# Patient Record
Sex: FEMALE | Race: WHITE | URBAN - METROPOLITAN AREA
[De-identification: names, ages, dates, MRNs, and addresses within clinical notes are randomized per-mention and may not be internally consistent; named-entity substitution may affect disease eponyms.]

---

## 2024-02-02 DIAGNOSIS — J45.20 MILD INTERMITTENT ASTHMA WITHOUT COMPLICATION: Primary | ICD-10-CM

## 2024-02-02 DIAGNOSIS — F41.9 ANXIETY: ICD-10-CM

## 2024-02-02 PROBLEM — J45.909 ASTHMA: Status: ACTIVE | Noted: 2024-02-02

## 2024-02-02 PROBLEM — Q97.0 TRIPLE X SYNDROME, FEMALE: Status: ACTIVE | Noted: 2018-07-30

## 2024-02-02 RX ORDER — MONTELUKAST SODIUM 10 MG/1
1 TABLET ORAL DAILY
COMMUNITY
End: 2024-02-05 | Stop reason: SDUPTHER

## 2024-02-02 RX ORDER — ALBUTEROL SULFATE 90 UG/1
1 AEROSOL, METERED RESPIRATORY (INHALATION) EVERY 4 HOURS PRN
COMMUNITY

## 2024-02-02 RX ORDER — ESCITALOPRAM OXALATE 20 MG/1
1 TABLET ORAL DAILY
COMMUNITY
End: 2024-02-05 | Stop reason: SDUPTHER

## 2024-02-02 RX ORDER — ALBUTEROL SULFATE 90 UG/1
1 AEROSOL, METERED RESPIRATORY (INHALATION) EVERY 4 HOURS PRN
COMMUNITY
End: 2024-02-05 | Stop reason: SDUPTHER

## 2024-02-05 ENCOUNTER — OFFICE VISIT (OUTPATIENT)
Age: 24
End: 2024-02-05
Payer: COMMERCIAL

## 2024-02-05 VITALS
HEIGHT: 69 IN | DIASTOLIC BLOOD PRESSURE: 70 MMHG | SYSTOLIC BLOOD PRESSURE: 98 MMHG | HEART RATE: 75 BPM | WEIGHT: 178.2 LBS | TEMPERATURE: 98 F | BODY MASS INDEX: 26.39 KG/M2 | OXYGEN SATURATION: 98 %

## 2024-02-05 DIAGNOSIS — F41.9 ANXIETY: ICD-10-CM

## 2024-02-05 DIAGNOSIS — Z00.00 ENCOUNTER FOR WELL ADULT EXAM WITHOUT ABNORMAL FINDINGS: ICD-10-CM

## 2024-02-05 DIAGNOSIS — Z23 ENCOUNTER FOR IMMUNIZATION: Primary | ICD-10-CM

## 2024-02-05 DIAGNOSIS — J45.20 MILD INTERMITTENT ASTHMA WITHOUT COMPLICATION: ICD-10-CM

## 2024-02-05 PROCEDURE — 99395 PREV VISIT EST AGE 18-39: CPT | Performed by: FAMILY MEDICINE

## 2024-02-05 PROCEDURE — 90471 IMMUNIZATION ADMIN: CPT | Performed by: FAMILY MEDICINE

## 2024-02-05 PROCEDURE — 90674 CCIIV4 VAC NO PRSV 0.5 ML IM: CPT | Performed by: FAMILY MEDICINE

## 2024-02-05 RX ORDER — ESCITALOPRAM OXALATE 20 MG/1
20 TABLET ORAL DAILY
Qty: 30 TABLET | Refills: 11 | Status: SHIPPED | OUTPATIENT
Start: 2024-02-05

## 2024-02-05 RX ORDER — ALBUTEROL SULFATE 90 UG/1
1 AEROSOL, METERED RESPIRATORY (INHALATION) EVERY 4 HOURS PRN
Qty: 18 G | Refills: 2 | Status: SHIPPED | OUTPATIENT
Start: 2024-02-05

## 2024-02-05 RX ORDER — MONTELUKAST SODIUM 10 MG/1
10 TABLET ORAL DAILY
Qty: 30 TABLET | Refills: 11 | Status: SHIPPED | OUTPATIENT
Start: 2024-02-05 | End: 2024-02-05 | Stop reason: SDUPTHER

## 2024-02-05 RX ORDER — MONTELUKAST SODIUM 10 MG/1
10 TABLET ORAL DAILY
Qty: 30 TABLET | Refills: 11 | Status: SHIPPED | OUTPATIENT
Start: 2024-02-05

## 2024-02-05 RX ORDER — ALBUTEROL SULFATE 90 UG/1
1 AEROSOL, METERED RESPIRATORY (INHALATION) EVERY 4 HOURS PRN
Qty: 18 G | Refills: 2 | Status: SHIPPED | OUTPATIENT
Start: 2024-02-05 | End: 2024-02-05 | Stop reason: SDUPTHER

## 2024-02-05 RX ORDER — ESCITALOPRAM OXALATE 20 MG/1
20 TABLET ORAL DAILY
Qty: 30 TABLET | Refills: 11 | Status: SHIPPED | OUTPATIENT
Start: 2024-02-05 | End: 2024-02-05 | Stop reason: SDUPTHER

## 2024-02-05 SDOH — ECONOMIC STABILITY: FOOD INSECURITY: WITHIN THE PAST 12 MONTHS, THE FOOD YOU BOUGHT JUST DIDN'T LAST AND YOU DIDN'T HAVE MONEY TO GET MORE.: NEVER TRUE

## 2024-02-05 SDOH — ECONOMIC STABILITY: HOUSING INSECURITY
IN THE LAST 12 MONTHS, WAS THERE A TIME WHEN YOU DID NOT HAVE A STEADY PLACE TO SLEEP OR SLEPT IN A SHELTER (INCLUDING NOW)?: NO

## 2024-02-05 SDOH — ECONOMIC STABILITY: FOOD INSECURITY: WITHIN THE PAST 12 MONTHS, YOU WORRIED THAT YOUR FOOD WOULD RUN OUT BEFORE YOU GOT MONEY TO BUY MORE.: NEVER TRUE

## 2024-02-05 SDOH — ECONOMIC STABILITY: INCOME INSECURITY: HOW HARD IS IT FOR YOU TO PAY FOR THE VERY BASICS LIKE FOOD, HOUSING, MEDICAL CARE, AND HEATING?: NOT HARD AT ALL

## 2024-02-05 ASSESSMENT — PATIENT HEALTH QUESTIONNAIRE - PHQ9
SUM OF ALL RESPONSES TO PHQ9 QUESTIONS 1 & 2: 0
2. FEELING DOWN, DEPRESSED OR HOPELESS: 0
SUM OF ALL RESPONSES TO PHQ QUESTIONS 1-9: 0

## 2024-02-05 NOTE — PROGRESS NOTES
[facility]  [facility]  [address] [place] [place] [address]     Date of Visit:  2024  Patient Name: Julia Lopez   Patient :  2000     CHIEF COMPLAINT/HPI:     Chief Complaint   Patient presents with    Gynecologic Exam     She is here for her annual visit        HPI      Julia Lopez, 24 y.o. presents today for a well adult exam.     Her periods have always occurred monthly and consisted of bleeding for a few days then stopping for a day before bleeding continued for the rest of the week. She notes having heavy bleeding and cramping at the beginning of her period but no clotting. Her periods still last for one week and occur monthly, however a few months ago bleeding began stopping twice instead of once during the week of her period.     She takes Montelukast routinely with good control of her asthma. She uses her inhaler as needed before intense exercise, but does not need to use it before walking.    She reports her anxiety is well controlled on her current dose of Lexapro.     She denies headaches, dizziness, syncope, chest pain, dyspnea, palpitations, nausea, vomiting, diarrhea, constipation, blood in her stool, dark stool, urinary frequency/urgency/hematuria/dysuria, swelling in her extremities, skin changes, vision changes, fever or chills.    She is working on following up with her eye doctor and is looking into getting a new dentist.     She is unsure of family history of cancer. She has one sister.  She is working and living in [place].  She isn't sexually active.    REVIEW OF SYSTEM      Review of Systems:   Constitutional:  Negative for chills, fatigue, fever and unexpected weight change.   Eyes:  Negative for visual disturbance.   Respiratory:  Negative for cough, chest tightness, shortness of breath and wheezing.    Cardiovascular:  Negative for chest pain, palpitations and leg swelling.   Gastrointestinal:  Negative for abdominal distention,

## 2024-02-05 NOTE — PROGRESS NOTES
After obtaining consent, and per orders of Dr. Miller, injection of flu given in Left deltoid by Tete Wright. Patient tolerated the injection well.  VIS given

## 2025-02-10 DIAGNOSIS — J45.20 MILD INTERMITTENT ASTHMA WITHOUT COMPLICATION: ICD-10-CM

## 2025-02-10 RX ORDER — MONTELUKAST SODIUM 10 MG/1
10 TABLET ORAL DAILY
Qty: 90 TABLET | Refills: 1 | Status: SHIPPED | OUTPATIENT
Start: 2025-02-10

## 2025-02-10 NOTE — TELEPHONE ENCOUNTER
Julia Lopez is calling to request a refill on the following medication(s):    Medication Request:  Requested Prescriptions     Pending Prescriptions Disp Refills    montelukast (SINGULAIR) 10 MG tablet [Pharmacy Med Name: Montelukast Sodium Oral Tablet 10 MG] 30 tablet 0     Sig: TAKE 1 TABLET BY MOUTH EVERY DAY       Last Visit Date (If Applicable):  2/5/2024    Next Visit Date:    Visit date not found

## 2025-02-12 DIAGNOSIS — F41.9 ANXIETY: ICD-10-CM

## 2025-02-12 RX ORDER — ESCITALOPRAM OXALATE 20 MG/1
20 TABLET ORAL DAILY
Qty: 30 TABLET | Refills: 1 | Status: SHIPPED | OUTPATIENT
Start: 2025-02-12

## 2025-02-12 NOTE — TELEPHONE ENCOUNTER
Julia Lopez is calling to request a refill on the following medication(s):    Medication Request:  Requested Prescriptions     Pending Prescriptions Disp Refills    escitalopram (LEXAPRO) 20 MG tablet [Pharmacy Med Name: Escitalopram Oxalate Oral Tablet 20 MG] 30 tablet 0     Sig: TAKE 1 TABLET BY MOUTH EVERY DAY       Last Visit Date (If Applicable):  2/5/2024    Next Visit Date:    Visit date not found

## 2025-04-13 DIAGNOSIS — F41.9 ANXIETY: ICD-10-CM

## 2025-04-14 RX ORDER — ESCITALOPRAM OXALATE 20 MG/1
20 TABLET ORAL DAILY
Qty: 30 TABLET | Refills: 0 | Status: SHIPPED | OUTPATIENT
Start: 2025-04-14

## 2025-04-15 ENCOUNTER — PATIENT MESSAGE (OUTPATIENT)
Age: 25
End: 2025-04-15

## 2025-05-11 DIAGNOSIS — F41.9 ANXIETY: ICD-10-CM

## 2025-05-12 RX ORDER — ESCITALOPRAM OXALATE 20 MG/1
20 TABLET ORAL DAILY
Qty: 30 TABLET | Refills: 2 | Status: SHIPPED | OUTPATIENT
Start: 2025-05-12

## 2025-05-12 NOTE — TELEPHONE ENCOUNTER
Appt is scheduled for 05/29/25    Julia Lopez is calling to request a refill on the following medication(s):    Medication Request:  Requested Prescriptions     Pending Prescriptions Disp Refills    escitalopram (LEXAPRO) 20 MG tablet [Pharmacy Med Name: Escitalopram Oxalate Oral Tablet 20 MG] 30 tablet 0     Sig: TAKE 1 TABLET BY MOUTH EVERY DAY       Last Visit Date (If Applicable):  Visit date not found    Next Visit Date:    Visit date not found